# Patient Record
Sex: FEMALE | URBAN - METROPOLITAN AREA
[De-identification: names, ages, dates, MRNs, and addresses within clinical notes are randomized per-mention and may not be internally consistent; named-entity substitution may affect disease eponyms.]

---

## 2018-05-18 ENCOUNTER — EMERGENCY (EMERGENCY)
Facility: HOSPITAL | Age: 28
LOS: 1 days | Discharge: HOME | End: 2018-05-18
Attending: EMERGENCY MEDICINE
Payer: COMMERCIAL

## 2018-05-18 VITALS
HEART RATE: 111 BPM | OXYGEN SATURATION: 99 % | RESPIRATION RATE: 20 BRPM | SYSTOLIC BLOOD PRESSURE: 127 MMHG | DIASTOLIC BLOOD PRESSURE: 86 MMHG | TEMPERATURE: 98 F

## 2018-05-18 VITALS
TEMPERATURE: 98 F | SYSTOLIC BLOOD PRESSURE: 107 MMHG | HEART RATE: 70 BPM | RESPIRATION RATE: 18 BRPM | DIASTOLIC BLOOD PRESSURE: 69 MMHG | OXYGEN SATURATION: 98 %

## 2018-05-18 LAB
ALBUMIN SERPL ELPH-MCNC: 4.5 G/DL — SIGNIFICANT CHANGE UP (ref 3.3–5)
ALP SERPL-CCNC: 38 U/L — LOW (ref 40–120)
ALT FLD-CCNC: 16 U/L — SIGNIFICANT CHANGE UP (ref 10–45)
ANION GAP SERPL CALC-SCNC: 15 MMOL/L — SIGNIFICANT CHANGE UP (ref 5–17)
APTT BLD: 29.4 SEC — SIGNIFICANT CHANGE UP (ref 27.5–37.4)
AST SERPL-CCNC: 15 U/L — SIGNIFICANT CHANGE UP (ref 10–40)
BASOPHILS # BLD AUTO: 0 K/UL — SIGNIFICANT CHANGE UP (ref 0–0.2)
BASOPHILS NFR BLD AUTO: 0.2 % — SIGNIFICANT CHANGE UP (ref 0–2)
BILIRUB SERPL-MCNC: 0.3 MG/DL — SIGNIFICANT CHANGE UP (ref 0.2–1.2)
BUN SERPL-MCNC: 17 MG/DL — SIGNIFICANT CHANGE UP (ref 7–23)
CALCIUM SERPL-MCNC: 9.8 MG/DL — SIGNIFICANT CHANGE UP (ref 8.4–10.5)
CHLORIDE SERPL-SCNC: 107 MMOL/L — SIGNIFICANT CHANGE UP (ref 96–108)
CO2 SERPL-SCNC: 23 MMOL/L — SIGNIFICANT CHANGE UP (ref 22–31)
CREAT SERPL-MCNC: 0.87 MG/DL — SIGNIFICANT CHANGE UP (ref 0.5–1.3)
EOSINOPHIL # BLD AUTO: 0 K/UL — SIGNIFICANT CHANGE UP (ref 0–0.5)
EOSINOPHIL NFR BLD AUTO: 0.3 % — SIGNIFICANT CHANGE UP (ref 0–6)
GLUCOSE SERPL-MCNC: 113 MG/DL — HIGH (ref 70–99)
HCG SERPL QL: NEGATIVE — SIGNIFICANT CHANGE UP
HCT VFR BLD CALC: 42.2 % — SIGNIFICANT CHANGE UP (ref 34.5–45)
HGB BLD-MCNC: 14.2 G/DL — SIGNIFICANT CHANGE UP (ref 11.5–15.5)
INR BLD: 1.04 RATIO — SIGNIFICANT CHANGE UP (ref 0.88–1.16)
LYMPHOCYTES # BLD AUTO: 0.6 K/UL — LOW (ref 1–3.3)
LYMPHOCYTES # BLD AUTO: 7.2 % — LOW (ref 13–44)
MCHC RBC-ENTMCNC: 33 PG — SIGNIFICANT CHANGE UP (ref 27–34)
MCHC RBC-ENTMCNC: 33.7 GM/DL — SIGNIFICANT CHANGE UP (ref 32–36)
MCV RBC AUTO: 98.1 FL — SIGNIFICANT CHANGE UP (ref 80–100)
MONOCYTES # BLD AUTO: 0.4 K/UL — SIGNIFICANT CHANGE UP (ref 0–0.9)
MONOCYTES NFR BLD AUTO: 4.8 % — SIGNIFICANT CHANGE UP (ref 2–14)
NEUTROPHILS # BLD AUTO: 7 K/UL — SIGNIFICANT CHANGE UP (ref 1.8–7.4)
NEUTROPHILS NFR BLD AUTO: 87.4 % — HIGH (ref 43–77)
PLATELET # BLD AUTO: 179 K/UL — SIGNIFICANT CHANGE UP (ref 150–400)
POTASSIUM SERPL-MCNC: 3.9 MMOL/L — SIGNIFICANT CHANGE UP (ref 3.5–5.3)
POTASSIUM SERPL-SCNC: 3.9 MMOL/L — SIGNIFICANT CHANGE UP (ref 3.5–5.3)
PROT SERPL-MCNC: 7.8 G/DL — SIGNIFICANT CHANGE UP (ref 6–8.3)
PROTHROM AB SERPL-ACNC: 11.4 SEC — SIGNIFICANT CHANGE UP (ref 9.8–12.7)
RBC # BLD: 4.3 M/UL — SIGNIFICANT CHANGE UP (ref 3.8–5.2)
RBC # FLD: 11.1 % — SIGNIFICANT CHANGE UP (ref 10.3–14.5)
SODIUM SERPL-SCNC: 145 MMOL/L — SIGNIFICANT CHANGE UP (ref 135–145)
WBC # BLD: 8 K/UL — SIGNIFICANT CHANGE UP (ref 3.8–10.5)
WBC # FLD AUTO: 8 K/UL — SIGNIFICANT CHANGE UP (ref 3.8–10.5)

## 2018-05-18 PROCEDURE — 93010 ELECTROCARDIOGRAM REPORT: CPT

## 2018-05-18 PROCEDURE — 99284 EMERGENCY DEPT VISIT MOD MDM: CPT

## 2018-05-18 PROCEDURE — 85027 COMPLETE CBC AUTOMATED: CPT

## 2018-05-18 PROCEDURE — 99284 EMERGENCY DEPT VISIT MOD MDM: CPT | Mod: 25

## 2018-05-18 PROCEDURE — 85610 PROTHROMBIN TIME: CPT

## 2018-05-18 PROCEDURE — 93005 ELECTROCARDIOGRAM TRACING: CPT

## 2018-05-18 PROCEDURE — 85730 THROMBOPLASTIN TIME PARTIAL: CPT

## 2018-05-18 PROCEDURE — 80053 COMPREHEN METABOLIC PANEL: CPT

## 2018-05-18 PROCEDURE — 85379 FIBRIN DEGRADATION QUANT: CPT

## 2018-05-18 PROCEDURE — 71250 CT THORAX DX C-: CPT

## 2018-05-18 PROCEDURE — 71250 CT THORAX DX C-: CPT | Mod: 26

## 2018-05-18 PROCEDURE — 84703 CHORIONIC GONADOTROPIN ASSAY: CPT

## 2018-05-18 RX ORDER — ALPRAZOLAM 0.25 MG
1 TABLET ORAL
Qty: 6 | Refills: 0 | OUTPATIENT
Start: 2018-05-18 | End: 2018-05-19

## 2018-05-18 NOTE — ED ADULT NURSE REASSESSMENT NOTE - NS ED NURSE REASSESS COMMENT FT1
Pt currently resting comfortably in hallway. Pt. awaiting plan. Safety and comfort measures maintained. VSS NAD.

## 2018-05-18 NOTE — ED PROVIDER NOTE - CONSTITUTIONAL, MLM
normal... Well appearing, well nourished, awake, alert, oriented to person, place, time/situation and in no apparent distress. Speaking full sentences

## 2018-05-18 NOTE — ED PROVIDER NOTE - PLAN OF CARE
1) Follow up with your doctor in 1 week. Call tomorrow for an appointment.   2) Return to the ER immediately for new or worsening symptoms including trouble breathing, uncontrolled symptoms at home or other issues.

## 2018-05-18 NOTE — ED PROVIDER NOTE - PROGRESS NOTE DETAILS
Mike ADAMS - patient received on sign out. discussed results with patient and given a copy. symptoms improved. patient will follow up with pulmonologist. ATTG: : CT with no acute path. Labs as noted in chart. provided with copy of results. Patient was offered anxiolytic as she gets very anxious when these episodes occur. patient states that the last time she had a medrol dose pack and it got better. offered rx in case she has worsening symptoms but she is currently on Pred 20 mg and has one more days worth. she will call her Pulmonologist tomorrow and follow. offered her alt pulm. ATTG: : patient called later and reconsidered the prior offer for an anxiolytic. rx sent to [facility] for 0.25mg. explained to patient the side effects / risks / benefits of medication.

## 2018-05-18 NOTE — ED PROVIDER NOTE - ATTENDING CONTRIBUTION TO CARE
29 y/o f with pmhx asthma (never intubated/admitted) presents for on and off shortness of breath. Patient was being evaluated by Allergist and Pulmonologist and is currently on a course of steroids. already completed a course of steroids taper 2 months ago. no fever. was recenlty diagnosed with allergy to potatoes. Today denies eating any potatoes. feels a tightness in chest. no PE or DVT risk factors.   PMD Dr. Dawn  Gen.  no acute resp distress  HEENT:  PERRL EOMI   Lungs:  ctab/l no retractions O2 sat 100% on room air  CVS: S1S2   Abd;  soft non tender  Ext: no edema no calf tenderness  Neuro: aaox3 no focal deficits  MSK: 5/5 x 4

## 2018-05-18 NOTE — ED ADULT NURSE REASSESSMENT NOTE - TEMPLATE LIST FOR HEAD TO TOE ASSESSMENT
[address] 
[phone] Patient: Babak Zaragoza MRN: [id number] SEN:[id number] Visit Information Date & Time Provider Department Dept. Phone Encounter #  
 3/27/2018 10:20 AM Neli Montanez MD [address] [facility] [id number] Follow-up Instructions Return in about 4 weeks (around 4/24/2018). Your Appointments 7/26/2018  9:45 AM  
ROUTINE CARE with Siobhan Cooley MD  
[facility]) Appt Note: follow up  
 [address] [phone]  
[phone]  
  
   
 [address] [address] Upcoming Health Maintenance Date Due DTaP/Tdap/Td series (1 - Tdap) 9/3/1981 FOBT Q 1 YEAR AGE 50-75 11/2/2018 BREAST CANCER SCRN MAMMOGRAM 12/12/2019 PAP AKA CERVICAL CYTOLOGY 1/25/2021 Allergies as of 3/27/2018  Review Complete On: 3/27/2018 By: Carly Ramos LPN No Known Allergies Current Immunizations  Reviewed on 1/8/2018 Name Date  
 TB Skin Test (PPD) Intradermal 1/8/2018, 1/8/2018 Not reviewed this visit You Were Diagnosed With   
  
 Codes Comments Seropositive rheumatoid arthritis of multiple sites [facility])    -  Primary ICD-10-CM: M05.79 ICD-9-CM: 714.0 Long-term use of immunosuppressant medication     ICD-10-CM: Z79.899 ICD-9-CM: V58.69 Dyspnea, unspecified type     ICD-10-CM: R06.00 
ICD-9-CM: 786.09 Vitamin D deficiency     ICD-10-CM: E55.9 ICD-9-CM: 268.9 Vitals BP Pulse Temp Resp Height(growth percentile) Weight(growth percentile) 110/73 (BP 1 Location: Left arm, BP Patient Position: Sitting) 76 97.8 °F (36.6 °C) (Oral) 18 5' 2\" (1.575 m) 206 lb 12.8 oz (93.8 kg) SpO2 BMI OB Status Smoking Status 97% 37.82 kg/m2 Menopause Former Smoker Vitals History BMI and BSA Data Body Mass Index Body Surface Area  
 37.82 kg/m 2 2.03 m 2 Preferred Pharmacy [facility], [address] [place] [address] [phone] Your Updated Medication List  
  
   
This list is accurate as of 3/27/18 10:40 AM.  Always use your most recent med list. ADVIL -38 mg Tab Generic drug:  Ibuprofen-diphenhydrAMINE Take 200 mg by mouth as needed. cetirizine 10 mg tablet Commonly known as:  ZYRTEC Take 1 Tab by mouth daily. ergocalciferol 50,000 unit capsule Commonly known as:  ERGOCALCIFEROL Take 1 Cap by mouth every seven (7) days. folic acid 1 mg tablet Commonly known as:  Google Take 1 mg by mouth daily. Indications: does not take on Mondays  
  
 methotrexate 2.5 mg tablet Commonly known as:  Rondall Mosque Take 8 Tabs by mouth every Monday for 90 days. Senna 8.6 mg Cap Generic drug:  sennosides Take 8.6 mg by mouth as needed. SINGULAIR 10 mg tablet Generic drug:  montelukast  
Take 10 mg by mouth as needed. * tofacitinib 11 mg Tb24 Commonly known as:  XELJANZ XR Take 11 mg by mouth daily for 30 days. * tofacitinib 11 mg Tb24 Commonly known as:  XELJANZ XR Take 11 mg by mouth daily for 30 days. Indications: Rheumatoid Arthritis * Notice: This list has 2 medication(s) that are the same as other medications prescribed for you. Read the directions carefully, and ask your doctor or other care provider to review them with you. We Performed the Following C REACTIVE PROTEIN, QT [77533 CPT(R)] CBC WITH AUTOMATED DIFF [93771 CPT(R)] METABOLIC PANEL, COMPREHENSIVE [51286 CPT(R)] SED RATE (ESR) [id number] CPT(R)] VITAMIN D, 25 HYDROXY [id number] CPT(R)] Follow-up Instructions Return in about 4 weeks (around 4/24/2018). To-Do List   
 03/27/2018 Imaging:  CT CHEST WO CONT Respiratory 03/27/2018 PFT:  PFT COMPLETE   
  
 03/27/2018 PFT:  PFT DLCO   
  
 03/27/2018 Imaging:  XR CHEST PA LAT Patient Instructions Please call the Patient Care Scheduling Team [phone] to schedule your test (PFT, DLCO, CT Chest). Introducing [facility]! Dear Ariella Carter: 
Thank you for requesting a [employer] account. Our records indicate that you already have an active 19[employer] account. You can access your account anytime at [email or web address]. [email or web address] Did you know that you can access your hospital and ER discharge instructions at any time in [employer]? You can also review all of your test results from your hospital stay or ER visit. Additional Information If you have questions, please visit the Frequently Asked Questions section of the 19[employer] website at [email or web address] Remember, 19[employer] is NOT to be used for urgent needs. For medical emergencies, dial 911. Now available from your iPhone and Android! Please provide this summary of care documentation to your next provider. Your primary care clinician is listed as Dex Denny. If you have any questions after today's visit, please call [phone].

## 2018-05-18 NOTE — ED PROVIDER NOTE - OBJECTIVE STATEMENT
29 y/o female hx of asthma, allergic to potatoes (new) where she ate potatoes 2 months ago possibly, got throat tingling and shortness of breath (felt like she was breathing through a straw). Resolved w/ albuterol nebs at home. Few days after felt tightness in her chest. Took a prednisone taper w/ resolve. On Tuesday was at school and when she got out she felt tight in chest that got worse w/ worsening SOB. EMS took patient to [facility] - told her saturation was fine, and got nebs, and was told she had "swelling in bronchiole tubes." Started on Steroids (day 4 of 40mg daily), Symbicort, and Singulair. Today this am felt better, went to school and as day went on she got worsening SOB - called her pulmonologist who told her to come in at 2:15 but was so short of breathe she came here instead. Right now, feels better since being inside. Still having sharp pain in her chest, waxing and waning. Not on any OCP anymore b/c had blood work that showed she was increased risk for clotting. No hx of DVT in the past. No leg pain or swelling.  Pulmonologist: Dr. Dawn   PMD: Jose

## 2018-05-18 NOTE — ED PROVIDER NOTE - CARE PLAN
Principal Discharge DX:	Allergic bronchitis with acute exacerbation  Assessment and plan of treatment:	1) Follow up with your doctor in 1 week. Call tomorrow for an appointment.   2) Return to the ER immediately for new or worsening symptoms including trouble breathing, uncontrolled symptoms at home or other issues.

## 2021-03-02 NOTE — ED ADULT NURSE NOTE - NS ED NOTE  TALK SOMEONE YN
What Type Of Note Output Would You Prefer (Optional)?: Bullet Format Has Your Skin Lesion Been Treated?: been treated Is This A New Presentation, Or A Follow-Up?: Skin Lesion No

## 2021-12-23 ENCOUNTER — TRANSCRIPTION ENCOUNTER (OUTPATIENT)
Age: 31
End: 2021-12-23

## 2022-03-02 ENCOUNTER — TRANSCRIPTION ENCOUNTER (OUTPATIENT)
Age: 32
End: 2022-03-02

## 2022-04-25 ENCOUNTER — TRANSCRIPTION ENCOUNTER (OUTPATIENT)
Age: 32
End: 2022-04-25

## 2022-07-05 ENCOUNTER — NON-APPOINTMENT (OUTPATIENT)
Age: 32
End: 2022-07-05

## 2022-07-06 ENCOUNTER — APPOINTMENT (OUTPATIENT)
Dept: ULTRASOUND IMAGING | Facility: CLINIC | Age: 32
End: 2022-07-06

## 2022-07-06 ENCOUNTER — OUTPATIENT (OUTPATIENT)
Dept: OUTPATIENT SERVICES | Facility: HOSPITAL | Age: 32
LOS: 1 days | End: 2022-07-06
Payer: COMMERCIAL

## 2022-07-06 DIAGNOSIS — R10.2 PELVIC AND PERINEAL PAIN: ICD-10-CM

## 2022-07-07 PROBLEM — Z00.00 ENCOUNTER FOR PREVENTIVE HEALTH EXAMINATION: Status: ACTIVE | Noted: 2022-07-07

## 2022-07-08 ENCOUNTER — APPOINTMENT (OUTPATIENT)
Dept: MRI IMAGING | Facility: CLINIC | Age: 32
End: 2022-07-08

## 2022-07-09 ENCOUNTER — APPOINTMENT (OUTPATIENT)
Dept: MRI IMAGING | Facility: HOSPITAL | Age: 32
End: 2022-07-09

## 2022-09-17 ENCOUNTER — NON-APPOINTMENT (OUTPATIENT)
Age: 32
End: 2022-09-17

## 2022-12-26 ENCOUNTER — APPOINTMENT (OUTPATIENT)
Dept: ULTRASOUND IMAGING | Facility: CLINIC | Age: 32
End: 2022-12-26
Payer: COMMERCIAL

## 2022-12-26 ENCOUNTER — OUTPATIENT (OUTPATIENT)
Dept: OUTPATIENT SERVICES | Facility: HOSPITAL | Age: 32
LOS: 1 days | End: 2022-12-26
Payer: COMMERCIAL

## 2022-12-26 DIAGNOSIS — Z00.8 ENCOUNTER FOR OTHER GENERAL EXAMINATION: ICD-10-CM

## 2023-01-09 ENCOUNTER — NON-APPOINTMENT (OUTPATIENT)
Age: 33
End: 2023-01-09

## 2023-01-16 ENCOUNTER — NON-APPOINTMENT (OUTPATIENT)
Age: 33
End: 2023-01-16

## 2023-11-22 PROBLEM — J45.909 UNSPECIFIED ASTHMA, UNCOMPLICATED: Chronic | Status: ACTIVE | Noted: 2018-05-18

## 2024-11-06 ENCOUNTER — NON-APPOINTMENT (OUTPATIENT)
Age: 34
End: 2024-11-06

## 2025-03-14 ENCOUNTER — NON-APPOINTMENT (OUTPATIENT)
Age: 35
End: 2025-03-14